# Patient Record
Sex: MALE
[De-identification: names, ages, dates, MRNs, and addresses within clinical notes are randomized per-mention and may not be internally consistent; named-entity substitution may affect disease eponyms.]

---

## 2023-09-21 ENCOUNTER — NURSE TRIAGE (OUTPATIENT)
Dept: OTHER | Facility: CLINIC | Age: 43
End: 2023-09-21

## 2023-09-21 NOTE — TELEPHONE ENCOUNTER
Location of patient: [facility] or Physician Name: Magda Hernandez"     Subjective: Caller states \"Thinks has had a stomach flu for past two weeks\"     Current Symptoms: Had head cold the first week, congestion, loss of voice and that mostly resolved. Still has some congestion. COVID test was negative 3 days into this. Lost 12 lbs in 2 weeks, household is sick (spouse and kids), diarrhea- from 8 pm last night has had 15 episodes, feels \"tightness\" in upper abdomen, nausea. Associated Symptoms: NA    Pain Severity: 2/10; tight; intermittent    Temperature: Denies fever     What has been tried: Fluids    Recommended disposition: See in Office Today    Care advice provided, patient verbalizes understanding; denies any other questions or concerns. Outcome: Patient to be seen within 24 hours- see in office today.       [place] [facility] [place] for Disposition   [1] SEVERE diarrhea (e.g., 7 or more times / day more than normal) AND [2] present > 24 hours (1 day)    Protocols used: Diarrhea-ADULT-